# Patient Record
Sex: FEMALE | Race: BLACK OR AFRICAN AMERICAN | Employment: FULL TIME | URBAN - METROPOLITAN AREA
[De-identification: names, ages, dates, MRNs, and addresses within clinical notes are randomized per-mention and may not be internally consistent; named-entity substitution may affect disease eponyms.]

---

## 2020-12-22 PROBLEM — H33.20 RETINAL DETACHMENT: Status: ACTIVE | Noted: 2020-12-22

## 2020-12-22 PROBLEM — H35.349 MACULAR HOLE: Chronic | Status: ACTIVE | Noted: 2020-12-22

## 2020-12-22 PROBLEM — H54.8 LEGALLY BLIND: Status: ACTIVE | Noted: 2020-12-22

## 2021-05-12 ENCOUNTER — OFFICE VISIT (OUTPATIENT)
Dept: PRIMARY CARE CLINIC | Age: 61
End: 2021-05-12
Payer: MEDICARE

## 2021-05-12 VITALS
TEMPERATURE: 97.5 F | WEIGHT: 224 LBS | DIASTOLIC BLOOD PRESSURE: 81 MMHG | RESPIRATION RATE: 16 BRPM | BODY MASS INDEX: 39.69 KG/M2 | HEIGHT: 63 IN | OXYGEN SATURATION: 98 % | SYSTOLIC BLOOD PRESSURE: 138 MMHG | HEART RATE: 97 BPM

## 2021-05-12 DIAGNOSIS — F41.8 ANXIETY ABOUT HEALTH: ICD-10-CM

## 2021-05-12 DIAGNOSIS — R06.89 DIFFICULTY BREATHING: Primary | ICD-10-CM

## 2021-05-12 PROCEDURE — 99214 OFFICE O/P EST MOD 30 MIN: CPT | Performed by: NURSE PRACTITIONER

## 2021-05-12 RX ORDER — LEVETIRACETAM 500 MG/1
250 TABLET ORAL 2 TIMES DAILY
COMMUNITY

## 2021-05-12 RX ORDER — ERGOCALCIFEROL 1.25 MG/1
50000 CAPSULE ORAL
COMMUNITY

## 2021-05-12 RX ORDER — LORATADINE 10 MG/1
10 TABLET ORAL DAILY
Qty: 30 TAB | Refills: 2 | Status: SHIPPED | OUTPATIENT
Start: 2021-05-12 | End: 2022-05-07

## 2021-05-12 NOTE — PROGRESS NOTES
HISTORY OF PRESENT ILLNESS  Maria Del Carmen Elizondo is a 64 y.o. female presents for   Chief Complaint   Patient presents with    Breathing Problem   Patient presents today with intermittent need to take deep breaths while watching TV in the evening has no associated symptoms like chest pains or shortness of breath or nausea or diaphoresis nor does she have swelling of the ankles periods are typically 1 time every so often and then goes away she has no explanation for these episodes    Vitals:    05/12/21 1615   BP: 138/81   BP 1 Location: Right arm   BP Patient Position: Sitting   Pulse: 97   Resp: 16   Temp: 97.5 °F (36.4 °C)   TempSrc: Oral   SpO2: 98%   Weight: 224 lb (101.6 kg)   Height: 5' 3\" (1.6 m)      Patient Active Problem List   Diagnosis Code    Nausea & vomiting R11.2    Seizures (HCC) R56.9    Esophageal reflux K21.9    Dysphagia R13.10    Acute pancreatitis K85.90    Retinal detachment H33.20    Macular hole H35.349    Legally blind H54.8     Patient Active Problem List    Diagnosis Date Noted    Retinal detachment 12/22/2020    Macular hole 12/22/2020    Legally blind 12/22/2020    Acute pancreatitis 10/22/2013    Nausea & vomiting 10/21/2013    Seizures ([address] 10/21/2013    Esophageal reflux 10/21/2013    Dysphagia 10/21/2013     Current Outpatient Medications   Medication Sig Dispense Refill    levETIRAcetam (Keppra) 500 mg tablet Take 250 mg by mouth two (2) times a day.  ergocalciferol (Vitamin D2) 1,250 mcg (50,000 unit) capsule Take 50,000 Units by mouth.  multivitamin (ONE A DAY) tablet Take 1 Tab by mouth daily.  promethazine (PHENERGAN) 25 mg tablet Take 25 mg by mouth every six (6) hours as needed for Nausea.        No Known Allergies  Past Medical History:   Diagnosis Date    GERD (gastroesophageal reflux disease)     rarely    Nausea & vomiting     Seizures ([address]     last \"jerking part \" yesterday     Past Surgical History:   Procedure Laterality Date    HX ABDOMINAL LAPAROSCOPY      HX GYN  tubal ligation 2001    CT ABDOMEN SURGERY PROC UNLISTED      cholecytectomy      Family History   Problem Relation Age of Onset   Kee Beasley Arthritis-osteo Mother     Diabetes Maternal Grandmother      Social History     Tobacco Use    Smoking status: Never Smoker    Smokeless tobacco: Never Used   Substance Use Topics    Alcohol use: No           Review of Systems   Constitutional: Negative for fever and weight loss. HENT: Negative for sore throat and tinnitus. Eyes: Negative for blurred vision and double vision. Respiratory: Negative for shortness of breath (need to take deep breath). Cardiovascular: Negative for chest pain, palpitations and leg swelling. Gastrointestinal: Negative for constipation and diarrhea. Skin: Negative for itching and rash. Neurological: Negative for dizziness. Endo/Heme/Allergies: Does not bruise/bleed easily. Psychiatric/Behavioral: Negative for depression. The patient is not nervous/anxious and does not have insomnia. Physical Exam      ASSESSMENT and PLAN  Diagnoses and all orders for this visit:    1. Difficulty breathing  Comments:  needs to deep breath every so often   Orders:  -     loratadine (Claritin) 10 mg tablet; Take 1 Tab by mouth daily for 360 days. Perhaps could be allergies told patient to follow-up here or go to the ER if worsened or shortness of breath develops in the meantime try Claritin over-the-counter or at this prescription as needed and see if it resolves the issue   2. Anxiety about health  Comments:   Worried about her condition and if she had something wrong with her heart patient was assured and told to go to ER if worsened or return to office if needed            Mary Yuan NP

## 2021-05-12 NOTE — PROGRESS NOTES
1. Have you been to the ER, urgent care clinic since your last visit? Hospitalized since your last visit? No    2. Have you seen or consulted any other health care providers outside of the [address] [place] [address] since your last visit? Include any pap smears or colon screening.  No

## 2022-03-18 PROBLEM — H35.349 MACULAR HOLE: Status: ACTIVE | Noted: 2020-12-22

## 2022-03-18 PROBLEM — H33.20 RETINAL DETACHMENT: Status: ACTIVE | Noted: 2020-12-22

## 2022-03-19 PROBLEM — H54.8 LEGALLY BLIND: Status: ACTIVE | Noted: 2020-12-22

## 2023-05-18 RX ORDER — LEVETIRACETAM 500 MG/1
250 TABLET ORAL 2 TIMES DAILY
COMMUNITY

## 2023-05-18 RX ORDER — ERGOCALCIFEROL 1.25 MG/1
50000 CAPSULE ORAL
COMMUNITY

## 2023-05-18 RX ORDER — PROMETHAZINE HYDROCHLORIDE 25 MG/1
25 TABLET ORAL EVERY 6 HOURS PRN
COMMUNITY

## 2023-08-25 ENCOUNTER — HOSPITAL ENCOUNTER (EMERGENCY)
Facility: HOSPITAL | Age: 63
Discharge: HOME | End: 2023-08-25
Attending: STUDENT IN AN ORGANIZED HEALTH CARE EDUCATION/TRAINING PROGRAM
Payer: OTHER GOVERNMENT

## 2023-08-25 VITALS
HEART RATE: 70 BPM | SYSTOLIC BLOOD PRESSURE: 134 MMHG | DIASTOLIC BLOOD PRESSURE: 95 MMHG | TEMPERATURE: 98.4 F | RESPIRATION RATE: 18 BRPM | OXYGEN SATURATION: 97 % | HEIGHT: 63 IN | WEIGHT: 200 LBS | BODY MASS INDEX: 35.44 KG/M2

## 2023-08-25 DIAGNOSIS — R03.0 ELEVATED BLOOD PRESSURE READING: ICD-10-CM

## 2023-08-25 DIAGNOSIS — R42 LIGHTHEADEDNESS: Primary | ICD-10-CM

## 2023-08-25 PROCEDURE — 99282 EMERGENCY DEPT VISIT SF MDM: CPT

## 2023-08-25 ASSESSMENT — PAIN - FUNCTIONAL ASSESSMENT: PAIN_FUNCTIONAL_ASSESSMENT: NONE - DENIES PAIN

## 2023-08-25 ASSESSMENT — ENCOUNTER SYMPTOMS
NAUSEA: 0
COUGH: 0
ABDOMINAL PAIN: 0
VOMITING: 0
EYE REDNESS: 0
DIARRHEA: 0
EYE DISCHARGE: 0
SHORTNESS OF BREATH: 0

## 2023-08-26 NOTE — DISCHARGE INSTRUCTIONS
Drink plenty of fluids to stay hydrated. Please follow-up with primary care doctor for repeat blood pressure check, your blood pressure is mildly elevated here in the emergency department but primary care doctor needs to make the determination if you should be started on medications for blood pressure.

## 2023-08-26 NOTE — ED TRIAGE NOTES
Pt arrives to ED for feeling \"lightheaded\" starting today. Denies any other symptoms. Pt concerned about BP. /86 in triage.

## 2023-08-26 NOTE — ED NOTES
No IV to be removed. Pt given dc paperwork. Pt tolerated dc well. Pt stated understanding of teaching and denied questions. Pt ambulated out of ER with all belongings.      Neal Jean RN  08/25/23 2217

## 2024-10-12 ENCOUNTER — HOSPITAL ENCOUNTER (EMERGENCY)
Facility: HOSPITAL | Age: 64
Discharge: HOME | End: 2024-10-12
Attending: EMERGENCY MEDICINE
Payer: MEDICARE

## 2024-10-12 VITALS
WEIGHT: 220 LBS | RESPIRATION RATE: 16 BRPM | TEMPERATURE: 98.5 F | SYSTOLIC BLOOD PRESSURE: 141 MMHG | HEART RATE: 84 BPM | HEIGHT: 63 IN | OXYGEN SATURATION: 97 % | BODY MASS INDEX: 38.98 KG/M2 | DIASTOLIC BLOOD PRESSURE: 89 MMHG

## 2024-10-12 DIAGNOSIS — R03.0 ELEVATED BLOOD PRESSURE READING: Primary | ICD-10-CM

## 2024-10-12 DIAGNOSIS — R45.89 ANXIETY ABOUT HEALTH: ICD-10-CM

## 2024-10-12 PROCEDURE — 99282 EMERGENCY DEPT VISIT SF MDM: CPT

## 2024-10-12 ASSESSMENT — LIFESTYLE VARIABLES
HOW OFTEN DO YOU HAVE A DRINK CONTAINING ALCOHOL: NEVER
HOW MANY STANDARD DRINKS CONTAINING ALCOHOL DO YOU HAVE ON A TYPICAL DAY: PATIENT DOES NOT DRINK

## 2024-10-12 ASSESSMENT — PAIN - FUNCTIONAL ASSESSMENT: PAIN_FUNCTIONAL_ASSESSMENT: NONE - DENIES PAIN

## 2024-10-13 NOTE — ED TRIAGE NOTES
Pt to ED concerned that her BP may be high. Pt has known hx of HTN not controlled by medications. Pt states she has been trying to lose weight in hopes that it will help. Today pt states she's been feeling clammy all day and figured her BP must be high. NAD at triage. Pt reports slight headache but denies blurred vision, numbness, tingling, and gait issues. Pt also endorses feeling lightheaded intermittent. Vitals stable. /89.

## 2024-10-13 NOTE — DISCHARGE INSTRUCTIONS
Medicine  Sohail Perez MD Internal Medicine  Samira Benoit MD Internal Medicine  Rosa Mora, PA Family Medicine    [facility]: [address], [place] [address]. [phone]  Jes Rodgers MD Family Medicine  Jacqueline Watt MD Family Medicine  Tyrone Osorio, DO Infectious Disease  Hany Wood MD Family Medicine    [facility]: [address], [address], [place] [address]. [phone]  Rosemarie Freeman DO Family Medicine  Sofi Fan NP Internal Medicine  Daniella Bender, MIKE Internal Medicine    [facility]: [address], [place] [address]. [phone]  Rosaline Nava MD Internal Medicine  Stella Robledo MD Internal Medicine    [facility]: [address] [place] [address]. [phone]  Lina Bergeron MD Family Medicine  Carol Ann Clayton MD Family Medicine  Shadi Mcbride MD Family Medicine  Annette Esqueda, MIKE Family Medicine  Goyo Fan, ARPN, CNP Family Medicine    [facility]: [address], [place] [address]. [phone]  Lisa Martinez MD Internal Medicine  Jennifer Guidry MD Internal Medicine  Isaías Dalton MD Internal Medicine  Chris Tiwari MD Internal Medicine  Irma Holt MD Internal Medicine  Agnes Rose MD Internal Medicine  Carole Graves, MIKE Internal Medicine    [facility]: [address], [address], [place] [address]. [phone]  Breanna Sabillon MD Family Medicine  Najma Benitez MD Family Medicine  Narciso Kraus MD Family Medicine  Iris Mcqueen, PA Family Medicine  Leidy Sun, MIKE Family Medicine  Xochilt Lyon, MIKE Family Medicine  Tito Azar, NP Family Medicine    [facility]: [address], [address], [place] [address]. [phone]   Ina Vallecillo MD Family

## 2024-10-13 NOTE — ED NOTES
Discharge instructions reviewed, discharge papers given and pt teaching completed. No prescription(s) discussed. Pt instructed to follow up with PCP regarding today's visit. Pt also instructed to report to ED if symptoms return, worsen, don't subside, and/or with onset of new symptoms.

## 2024-10-13 NOTE — ED PROVIDER NOTES
Except as noted above the remainder of the review of systems was reviewed and negative.       PAST MEDICAL HISTORY     Past Medical History:   Diagnosis Date    GERD (gastroesophageal reflux disease)     rarely    Nausea & vomiting     Seizures (HCC)     last \"jerking part \" yesterday         SURGICAL HISTORY       Past Surgical History:   Procedure Laterality Date    GYN  tubal ligation 2001    LAPAROSCOPY      MS UNLISTED PROCEDURE ABDOMEN PERITONEUM & OMENTUM      cholecytectomy          CURRENT MEDICATIONS       Previous Medications    ERGOCALCIFEROL (ERGOCALCIFEROL) 1.25 MG (48984 UT) CAPSULE    Take 1 capsule by mouth    LEVETIRACETAM (KEPPRA) 500 MG TABLET    Take 0.5 tablets by mouth 2 times daily    PROMETHAZINE (PHENERGAN) 25 MG TABLET    Take 1 tablet by mouth every 6 hours as needed       ALLERGIES     Patient has no known allergies.    FAMILY HISTORY       Family History   Problem Relation Age of Onset    Osteoarthritis Mother     Diabetes Maternal Grandmother           SOCIAL HISTORY       Social History     Socioeconomic History    Marital status: Single     Spouse name: None    Number of children: None    Years of education: None    Highest education level: None   Tobacco Use    Smoking status: Never    Smokeless tobacco: Never   Substance and Sexual Activity    Alcohol use: No    Drug use: No           PHYSICAL EXAM    (up to 7 for level 4, 8 or more for level 5)     ED Triage Vitals [10/12/24 2237]   BP Systolic BP Percentile Diastolic BP Percentile Temp Temp Source Pulse Respirations SpO2   (!) 141/89 -- -- 98.5 °F (36.9 °C) Oral 84 16 97 %      Height Weight - Scale         1.6 m (5' 3\") 99.8 kg (220 lb)             Body mass index is 38.97 kg/m².    Physical Exam  Vitals and nursing note reviewed.   Constitutional:       Appearance: Normal appearance.   HENT:      Head: Normocephalic.      Right Ear: External ear normal.      Left Ear: External ear normal.      Nose: Nose normal.

## 2024-10-14 ENCOUNTER — CARE COORDINATION (OUTPATIENT)
Dept: OTHER | Facility: CLINIC | Age: 64
End: 2024-10-14

## 2024-10-14 NOTE — CARE COORDINATION
Ambulatory Care Coordination Note     10/14/2024 1:08 PM     Patient outreach attempt by this ACM today to offer care management services. ACM was unable to reach the patient by telephone today; left voice message requesting a return phone call to this ACM.     ACM: Rubia Sandhu RN       Follow Up:   Plan for next ACM outreach in approximately 1-2 days  to complete:  - outreach attempt to offer care management services.          Rubia Sandhu RN   [phone]

## 2024-10-16 ENCOUNTER — CARE COORDINATION (OUTPATIENT)
Dept: OTHER | Facility: CLINIC | Age: 64
End: 2024-10-16

## 2024-10-16 NOTE — CARE COORDINATION
PM.  Learner: Patient  Readiness: Acceptance  Method: Explanation  Response: Verbalizes Understanding    COGNITIVE : DIABETES-HYPERGLYCEMIA, taught by Rubia Bailey RN at 10/16/2024 12:17 PM.  Learner: Patient  Readiness: Acceptance  Method: Explanation  Response: Verbalizes Understanding    Diet, taught by Rubia Bailey RN at 10/16/2024 12:17 PM.  Learner: Patient  Readiness: Acceptance  Method: Explanation  Response: Verbalizes Understanding    Exercise, taught by Rubia Bailey RN at 10/16/2024 12:16 PM.  Learner: Patient  Readiness: Acceptance  Method: Explanation  Response: Verbalizes Understanding    Educate activity level, taught by Rubia Bailey RN at 10/16/2024 12:16 PM.  Learner: Patient  Readiness: Acceptance  Method: Explanation  Response: Verbalizes Understanding    Handout: What Can I Eat? Money Saving Shopping Tips, taught by Rubia Bialey RN at 10/16/2024 12:16 PM.  Learner: Patient  Readiness: Acceptance  Method: Explanation  Response: Verbalizes Understanding    Handout: What Can I Eat? [employer], taught by Rubia Bailey RN at 10/16/2024 12:16 PM.  Learner: Patient  Readiness: Acceptance  Method: Explanation  Response: Verbalizes Understanding    COGNITIVE : DIABETES-HYPERGLYCEMIA, taught by Rubia Bailey RN at 10/16/2024 12:16 PM.  Learner: Patient  Readiness: Acceptance  Method: Explanation  Response: Verbalizes Understanding    Diet, taught by Rubia Bailey RN at 10/16/2024 12:16 PM.  Learner: Patient  Readiness: Acceptance  Method: Explanation  Response: Verbalizes Understanding    Education Comments  No comments found.     , The following educational material were emailed to [email or web address] as per patient's request.                              Goals Addressed                   This Visit's Progress     Activity Plan        I will increase my activity by I will continue mowing my lawn, start to walk around the block or the mall more often for

## 2024-10-25 ENCOUNTER — CARE COORDINATION (OUTPATIENT)
Dept: OTHER | Facility: CLINIC | Age: 64
End: 2024-10-25

## 2024-10-28 ENCOUNTER — CARE COORDINATION (OUTPATIENT)
Dept: OTHER | Facility: CLINIC | Age: 64
End: 2024-10-28

## 2024-10-28 NOTE — CARE COORDINATION
Ambulatory Care Coordination Note     10/28/2024 10:42 AM     Patient Current Location:  Home: [address] [place] [id number]     ACM contacted the patient by telephone. Verified name and  with patient as identifiers.         ACM: Rubia Sandhu RN     Challenges to be reviewed by the provider   Additional needs identified to be addressed with provider No  none               Method of communication with provider: none.    Has the patient been seen in the ED since your last call? no    Care Summary Note: ACM outreached patient at this time for regular follow up call. She states she is doing well is just a bit stressed. M asked her to elaborate on stress but she just stated she is stressed and wouldn't give any further information. She states she received [facility]'s email with educational material but states she hasn't read through it just yet but she will. She states she has no needs or questions for AC at this time but appreciates the call. She is agreeable to [facility] reaching out in about 2 weeks for follow up.     Offered patient enrollment in the Remote Patient Monitoring (RPM) program for in-home monitoring: Patient is not eligible for RPM program because: affiliate provider.     Assessments Completed:       10/28/2024    10:41 AM   Amb Fall Risk Assessment and TUG Test   Do you feel unsteady or are you worried about falling?  no   2 or more falls in past year? no   Fall with injury in past year? no    ,   Ambulatory Care Coordination Assessment    Care Coordination Protocol  Referral from Primary Care Provider: No  Week 1 - Initial Assessment     Do you have all of your prescriptions and are they filled?: Yes  Barriers to medication adherence: None  Are you able to afford your medications?: Yes  How often do you have trouble taking your medications the way you have been told to take them?: I always take them as prescribed.     Do you have Home O2 Therapy?: No      Ability to seek help/take action for

## 2024-11-12 ENCOUNTER — CARE COORDINATION (OUTPATIENT)
Dept: OTHER | Facility: CLINIC | Age: 64
End: 2024-11-12

## 2024-11-12 NOTE — CARE COORDINATION
Ambulatory Care Coordination Note     2024 2:31 PM     Patient Current Location:  Home: [address] [place] [id number]     Patient contacted the ACM by telephone. Verified name and  with patient as identifiers.         ACM: Rubia Sandhu RN     Challenges to be reviewed by the provider   Additional needs identified to be addressed with provider No  none               Method of communication with provider: none.    Utilization: Has the patient been seen in the ED since your last call? no    Care Summary Note: Patient returned ACM's phone call at this time. She states she is doing well and has no concerns at this time. ACM asked if she has received the educational material this ACM sent her a few weeks ago, she states she hasn't looked but will check and call ACM if she has any questions. She appears disengaged and is not willingly sharing information or elaborating on things. ACM asked if she would prefer to call ACM when she has concerns or care management needs, patient agrees and declines further follow up calls. Patient has ACM's contact information for further needs. ACM will sign off.     Offered patient enrollment in the Remote Patient Monitoring (RPM) program for in-home monitoring: Patient is not eligible for RPM program because: affiliate provider.     Assessments Completed:   Hypertension - Encounter Level    Symptoms: hypertension associated anxiety: Neg, hypertension associated blurred vision: Neg, hypertension associated chest pain: Neg, hypertension associated headaches: Neg, hypertension associated malaise: Neg, hypertension associated fatigue: Neg, hypertension associated neck pain: Neg, hypertension associated orthopnea: Neg, hypertension associated palpitations: Neg, hypertension associated peripheral edema: Neg, hypertension associated PND: Neg, hypertension associated shortness of breath: Neg, hypertension associated sweats: Neg      ,   General Assessment    Do you have any

## 2024-11-12 NOTE — CARE COORDINATION
Ambulatory Care Coordination Note     11/12/2024 12:51 PM     Patient outreach attempt by this ACM today to perform care management follow up . ACM was unable to reach the patient by telephone today; voicemail full and unable to leave a message.      ACM: Rubia Sandhu RN         Follow Up:   Plan for next ACM outreach in approximately 1 week to complete:  - CC Protocol assessments  - disease specific assessments  - SDOH assessments  - medication review  - advance care planning  - goal progression  - education .         Rubia Sandhu RN   [phone]